# Patient Record
Sex: MALE | Race: WHITE | URBAN - NONMETROPOLITAN AREA
[De-identification: names, ages, dates, MRNs, and addresses within clinical notes are randomized per-mention and may not be internally consistent; named-entity substitution may affect disease eponyms.]

---

## 2020-12-28 LAB
ALBUMIN SERPL-MCNC: 0.4 G/DL
ALP BLD-CCNC: 41 U/L
ALT SERPL-CCNC: 28 U/L
ANION GAP SERPL CALCULATED.3IONS-SCNC: 16 MMOL/L
AST SERPL-CCNC: 19 U/L
BILIRUB SERPL-MCNC: 0.4 MG/DL (ref 0.1–1.4)
BUN BLDV-MCNC: 33 MG/DL
CALCIUM SERPL-MCNC: 9.9 MG/DL
CHLORIDE BLD-SCNC: 104 MMOL/L
CHOLESTEROL, TOTAL: 151 MG/DL
CHOLESTEROL/HDL RATIO: NORMAL
CO2: 29 MMOL/L
CREAT SERPL-MCNC: 1.64 MG/DL
CREATININE, URINE: 105
GFR CALCULATED: 41
GLUCOSE BLD-MCNC: 173 MG/DL
HDLC SERPL-MCNC: 40 MG/DL (ref 35–70)
LDL CHOLESTEROL CALCULATED: 81 MG/DL (ref 0–160)
MICROALBUMIN/CREAT 24H UR: 114.2 MG/G{CREAT}
MICROALBUMIN/CREAT UR-RTO: 108
NONHDLC SERPL-MCNC: NORMAL MG/DL
POTASSIUM SERPL-SCNC: 5.1 MMOL/L
SODIUM BLD-SCNC: 144 MMOL/L
TOTAL PROTEIN: 7.2
TRIGL SERPL-MCNC: 152 MG/DL
VLDLC SERPL CALC-MCNC: 30 MG/DL

## 2021-01-13 PROBLEM — Z79.4 TYPE 2 DIABETES MELLITUS WITH DIABETIC NEPHROPATHY, WITH LONG-TERM CURRENT USE OF INSULIN (HCC): Status: ACTIVE | Noted: 2019-02-20

## 2021-01-13 PROBLEM — E11.21 TYPE 2 DIABETES MELLITUS WITH DIABETIC NEPHROPATHY, WITH LONG-TERM CURRENT USE OF INSULIN (HCC): Status: ACTIVE | Noted: 2019-02-20

## 2021-01-13 PROBLEM — I10 ESSENTIAL HYPERTENSION, BENIGN: Status: ACTIVE | Noted: 2019-02-20

## 2021-01-13 PROBLEM — N40.0 BENIGN PROSTATIC HYPERPLASIA WITHOUT LOWER URINARY TRACT SYMPTOMS: Status: ACTIVE | Noted: 2019-02-20

## 2021-01-13 PROBLEM — E78.2 MIXED HYPERLIPIDEMIA: Status: ACTIVE | Noted: 2019-02-20

## 2021-01-19 ENCOUNTER — OFFICE VISIT (OUTPATIENT)
Dept: NEPHROLOGY | Age: 76
End: 2021-01-19
Payer: MEDICARE

## 2021-01-19 VITALS
BODY MASS INDEX: 22.89 KG/M2 | OXYGEN SATURATION: 97 % | WEIGHT: 155 LBS | DIASTOLIC BLOOD PRESSURE: 70 MMHG | SYSTOLIC BLOOD PRESSURE: 128 MMHG | HEART RATE: 71 BPM

## 2021-01-19 DIAGNOSIS — N20.0 NEPHROLITHIASIS: ICD-10-CM

## 2021-01-19 DIAGNOSIS — N18.32 STAGE 3B CHRONIC KIDNEY DISEASE (HCC): Primary | ICD-10-CM

## 2021-01-19 DIAGNOSIS — N40.1 BENIGN PROSTATIC HYPERPLASIA WITH LOWER URINARY TRACT SYMPTOMS, SYMPTOM DETAILS UNSPECIFIED: ICD-10-CM

## 2021-01-19 DIAGNOSIS — I10 HTN (HYPERTENSION), BENIGN: ICD-10-CM

## 2021-01-19 DIAGNOSIS — E11.21 DIABETIC NEPHROPATHY ASSOCIATED WITH TYPE 2 DIABETES MELLITUS (HCC): ICD-10-CM

## 2021-01-19 PROCEDURE — 4040F PNEUMOC VAC/ADMIN/RCVD: CPT | Performed by: INTERNAL MEDICINE

## 2021-01-19 PROCEDURE — G8420 CALC BMI NORM PARAMETERS: HCPCS | Performed by: INTERNAL MEDICINE

## 2021-01-19 PROCEDURE — G8427 DOCREV CUR MEDS BY ELIG CLIN: HCPCS | Performed by: INTERNAL MEDICINE

## 2021-01-19 PROCEDURE — 1036F TOBACCO NON-USER: CPT | Performed by: INTERNAL MEDICINE

## 2021-01-19 PROCEDURE — G8484 FLU IMMUNIZE NO ADMIN: HCPCS | Performed by: INTERNAL MEDICINE

## 2021-01-19 PROCEDURE — 1123F ACP DISCUSS/DSCN MKR DOCD: CPT | Performed by: INTERNAL MEDICINE

## 2021-01-19 PROCEDURE — 99205 OFFICE O/P NEW HI 60 MIN: CPT | Performed by: INTERNAL MEDICINE

## 2021-01-19 RX ORDER — NITROGLYCERIN 0.4 MG/1
0.4 TABLET SUBLINGUAL
COMMUNITY

## 2021-01-19 RX ORDER — EMPAGLIFLOZIN AND METFORMIN HYDROCHLORIDE 5; 1000 MG/1; MG/1
1 TABLET ORAL 2 TIMES DAILY
COMMUNITY
Start: 2020-09-02

## 2021-01-19 RX ORDER — INSULIN GLARGINE 300 U/ML
25 INJECTION, SOLUTION SUBCUTANEOUS EVERY MORNING
COMMUNITY
Start: 2020-09-02

## 2021-01-19 RX ORDER — HYDROCHLOROTHIAZIDE 25 MG/1
12.5 TABLET ORAL DAILY
COMMUNITY
Start: 2020-02-20 | End: 2021-02-26

## 2021-01-19 RX ORDER — DULAGLUTIDE 0.75 MG/.5ML
0.75 INJECTION, SOLUTION SUBCUTANEOUS WEEKLY
COMMUNITY
Start: 2020-11-23

## 2021-01-19 RX ORDER — CLOPIDOGREL BISULFATE 75 MG/1
75 TABLET ORAL DAILY
COMMUNITY

## 2021-01-19 ASSESSMENT — ENCOUNTER SYMPTOMS
BACK PAIN: 1
NAUSEA: 0
EYES NEGATIVE: 1
DIARRHEA: 0
COUGH: 0
ABDOMINAL PAIN: 0
SHORTNESS OF BREATH: 0
VOMITING: 0

## 2021-01-19 NOTE — PROGRESS NOTES
[facility]         Outpatient Initial consult note         1/19/2021 9:41 AM    [address] [address]  Dept: [phone]  Loc: [phone]      Patient Name:   Caesar Johnson  Birthdate:    [birth year]  Primary Care Physician:  Mingo Dinh DO     Chief Complaint : Evaluation of   worsening renal function     History of presenting illness :Caesar Johnson is a 67 y.o.   male with Past Medical History as stated below was sent / referred by Mingo Dinh DO forevaluation of the above problem. Patient has a history of hypertension for 35 years. Patient has history of diabetes mellitus, without retinopathy &nephropathy and for 35 years. The patient admits to history of renal stones , 40  years ago anddenies NSAID use. Patient has no history of proteinuria. Patient Never had a kidney biopsy done. Patient does not use Herbal remedies/vitamin supplements. Patient denies frequency, hematuria, hesitancy. Patient has no family history of kidney disease. Patient's chronic medical conditions of hypertension, diabetes and coronary artery disease are stable and well controlled with medications at this time    Patient recently had some lab work done by the PCP which showed that the patient's creatinine is worse and so nephrology has been consulted for further evaluation and management. Past History :     Past Medical History:   Diagnosis Date    Arthritis     CAD (coronary artery disease)     Chronic kidney disease     Diabetes mellitus ([address]     Hyperlipidemia     Hypertension      Past Surgical History:   Procedure Laterality Date    APPENDECTOMY      CHOLECYSTECTOMY      COLONOSCOPY      EYE SURGERY      HERNIA REPAIR      TONSILLECTOMY       Social History     Socioeconomic History    Marital status:       Spouse name: Not on file    Number of children: Not on file    Years of education: Not on file    Highest education level: Not on file   Occupational History    Not on file   Social Needs    Financial resource strain: Not on file    Food insecurity     Worry: Not on file     Inability: Not on file    Transportation needs     Medical: Not on file     Non-medical: Not on file   Tobacco Use    Smoking status: Former Smoker    Smokeless tobacco: Never Used   Substance and Sexual Activity    Alcohol use: No    Drug use: No    Sexual activity: Not Currently   Lifestyle    Physical activity     Days per week: Not on file     Minutes per session: Not on file    Stress: Not on file   Relationships    Social connections     Talks on phone: Not on file     Gets together: Not on file     Attends Religion service: Not on file     Active member of club or organization: Not on file     Attends meetings of clubs or organizations: Not on file     Relationship status: Not on file    Intimate partner violence     Fear of current or ex partner: Not on file     Emotionally abused: Not on file     Physically abused: Not on file     Forced sexual activity: Not on file   Other Topics Concern    Not on file   Social History Narrative    Not on file     Family History   Problem Relation Age of Onset    Cancer Mother      Medications & Allergies :      Prior to Admission medications    Medication Sig Start Date End Date Taking?  Authorizing Provider   clopidogrel (PLAVIX) 75 MG tablet Take 75 mg by mouth daily   Yes Historical Provider, MD   NONFORMULARY Take 1 capsule by mouth daily Florajejonas   Yes Historical Provider, MD   Glucos-MSM-C-Po-Vtkphd-Vjjkrc (GLUCOSAMINE MSM COMPLEX PO) Take by mouth daily   Yes Historical Provider, MD   hydroCHLOROthiazide (HYDRODIURIL) 25 MG tablet Take 12.5 mg by mouth daily  2/20/20  Yes Historical Provider, MD   Rosuvastatin Calcium 40 MG CPSP Take 40 mg by mouth every evening    Yes Historical Provider, MD Empagliflozin-metFORMIN HCl (SYNJARDY) 5-1000 MG TABS Take 1 tablet by mouth 2 times daily 9/2/20  Yes Historical Provider, MD   Insulin Glargine, 1 Unit Dial, (TOUJEO SOLOSTAR) 300 UNIT/ML SOPN Inject 25 Units into the skin every morning 9/2/20  Yes Historical Provider, MD   Dulaglutide (TRULICITY) 6.18 JS/6.2VJ SOPN Inject 0.75 mg into the skin once a week 11/23/20  Yes Historical Provider, MD   nitroGLYCERIN (NITROSTAT) 0.4 MG SL tablet Place 0.4 mg under the tongue   Yes Historical Provider, MD   vitamin D (CHOLECALCIFEROL) 125 MCG (5000 UT) CAPS capsule Take 5,000 Units by mouth 2 times daily   Yes Historical Provider, MD   aspirin 81 MG EC tablet Take 1 tablet by mouth daily. 4/22/15  Yes DARIO Joy CNP   lisinopril (PRINIVIL;ZESTRIL) 2.5 MG tablet Take 1 tablet by mouth daily. Patient taking differently: Take 20 mg by mouth daily  4/22/15  Yes DARIO Joy CNP   metoprolol (LOPRESSOR) 25 MG tablet Take 1 tablet by mouth 2 times daily. Patient taking differently: Take 50 mg by mouth 2 times daily  4/22/15  Yes DARIO Joy CNP   fenofibrate (TRICOR) 145 MG tablet Take 145 mg by mouth daily. Yes Historical Provider, MD   finasteride (PROSCAR) 5 MG tablet Take 5 mg by mouth daily. Yes Historical Provider, MD   Chromium Picolinate 1000 MCG TABS Take 1 tablet by mouth daily    Yes Historical Provider, MD   Cholecalciferol (VITAMIN D3) 5000 UNITS TABS Take 1 each by mouth daily. Yes Historical Provider, MD     Allergies: Latex    Review of Systems Physical Exam   Review of Systems   Constitutional: Negative for chills, fatigue and fever. HENT: Negative. Eyes: Negative. Respiratory: Negative for cough and shortness of breath. Cardiovascular: Negative for chest pain and leg swelling. Gastrointestinal: Negative for abdominal pain, diarrhea, nausea and vomiting. Genitourinary: Negative for dysuria, frequency and hematuria.    Musculoskeletal: Positive for back pain. Negative for neck pain. Skin: Negative for rash and wound. Neurological: Negative for dizziness, light-headedness and headaches. Psychiatric/Behavioral: Negative for agitation and confusion. Physical Exam  Vitals signs reviewed. Constitutional:       General: He is not in acute distress. Appearance: Normal appearance. He is not diaphoretic. HENT:      Head: Normocephalic and atraumatic. Right Ear: External ear normal.      Left Ear: External ear normal.      Nose: Nose normal.      Mouth/Throat:      Mouth: Mucous membranes are moist.      Pharynx: Oropharyngeal exudate present. Eyes:      General: No scleral icterus. Right eye: No discharge. Left eye: No discharge. Conjunctiva/sclera: Conjunctivae normal.   Neck:      Musculoskeletal: Normal range of motion and neck supple. Thyroid: No thyromegaly. Vascular: No JVD. Cardiovascular:      Rate and Rhythm: Normal rate and regular rhythm. Heart sounds: Normal heart sounds. No murmur. Pulmonary:      Effort: Pulmonary effort is normal. No respiratory distress. Breath sounds: Normal breath sounds. No stridor. Chest:      Chest wall: No tenderness. Abdominal:      General: Bowel sounds are normal. There is distension. Palpations: Abdomen is soft. Tenderness: There is no abdominal tenderness. Musculoskeletal:         General: No tenderness. Skin:     General: Skin is warm and dry. Findings: No erythema or rash. Neurological:      General: No focal deficit present. Mental Status: He is alert and oriented to person, place, and time.    Psychiatric:         Mood and Affect: Mood normal.         Behavior: Behavior normal.          Vitals   Vitals:    01/19/21 0850   BP: 128/70   Site: Left Upper Arm   Position: Sitting   Cuff Size: Medium Adult   Pulse: 71   SpO2: 97%   Weight: 155 lb (70.3 kg)        Labs, Radiology and Tests :    Labs -    21/20 Potassium 5.1           BUN 33           Creatinine 1.64           eGFR 41                                   UMCR 108                         Renal Ultrasound Scan --will order       Other : old  lab data have been reviewed and noted that patient's creatinine normally runs around 1.3 however in February 2020 it was 1.53      Assessment    1. Renal -patient definitely appears to be having chronic kidney disease stage III, this is most likely secondary to senile nephrosclerosis longstanding hypertension and diabetes  -However patient has a history of renal stones and severely enlarged prostate based on the CT scan in 2007. Cannot rule out a component of mild obstruction at this time  -I will obtain urinalysis urine protein creatinine ratio and a repeat BMP in a month. Patient is on hydrochlorothiazide though and he drinks a lot of liquids. Discontinue hydrochlorothiazide for now  2. Electrolytes appear to be within normal limits  3. Essential hypertension running well  4. History of nephrolithiasis his CT scan from 2007 shows renal stones we will reevaluate with a renal ultrasound scan  5. Hx of prostatomegaly based on the CT from 2007 not symptomatic however there was a concern for malignancy we will obtain a PSA level as well  6. Diabetes mellitus with diabetic nephropathy. 7. Patient also takes vitamin D 5000 units daily we will check a vitamin D level as well in the next visit  8. Medications reviewed discussed with patient and wife in detail  5. Follow-up in 4 weeks in [address] [place] [address] This Encounter   Procedures    US RENAL LIMITED    Comprehensive Metabolic Panel    Microalbumin / Creatinine Urine Ratio    Urinalysis with Microscopic    Protein / creatinine ratio, urine    PSA Prostatic Specific Antigen       Vick Schafer M.D  [facility].

## 2021-02-17 LAB
ALBUMIN SERPL-MCNC: 4.3 G/DL
ALP BLD-CCNC: 42 U/L
ALT SERPL-CCNC: 23 U/L
ANION GAP SERPL CALCULATED.3IONS-SCNC: 1.7 MMOL/L
AST SERPL-CCNC: 31 U/L
BILIRUB SERPL-MCNC: 0.5 MG/DL (ref 0.1–1.4)
BILIRUBIN, URINE: NEGATIVE
BLOOD, URINE: NEGATIVE
BUN BLDV-MCNC: 22 MG/DL
CALCIUM SERPL-MCNC: 9.7 MG/DL
CHLORIDE BLD-SCNC: 105 MMOL/L
CLARITY: CLEAR
CO2: 28 MMOL/L
COLOR: YELLOW
CREAT SERPL-MCNC: 1.2 MG/DL
CREATININE URINE: 65 MG/DL
GFR CALCULATED: 59
GLUCOSE BLD-MCNC: 228 MG/DL
GLUCOSE URINE: ABNORMAL
KETONES, URINE: NEGATIVE
LEUKOCYTE ESTERASE, URINE: NEGATIVE
MICROALBUMIN/CREAT 24H UR: 263.5 MG/G{CREAT}
NITRITE, URINE: NEGATIVE
PH UA: 5.5 (ref 4.5–8)
POTASSIUM SERPL-SCNC: 4.5 MMOL/L
PROTEIN UA: POSITIVE
SODIUM BLD-SCNC: 141 MMOL/L
SPECIFIC GRAVITY, URINE: 1.02
TOTAL PROTEIN: 6.8
UROBILINOGEN, URINE: NORMAL

## 2021-02-26 ENCOUNTER — OFFICE VISIT (OUTPATIENT)
Dept: NEPHROLOGY | Age: 76
End: 2021-02-26
Payer: MEDICARE

## 2021-02-26 VITALS
BODY MASS INDEX: 23.48 KG/M2 | HEART RATE: 71 BPM | DIASTOLIC BLOOD PRESSURE: 85 MMHG | WEIGHT: 159 LBS | OXYGEN SATURATION: 96 % | SYSTOLIC BLOOD PRESSURE: 143 MMHG

## 2021-02-26 DIAGNOSIS — I10 HTN (HYPERTENSION), BENIGN: ICD-10-CM

## 2021-02-26 DIAGNOSIS — N18.32 STAGE 3B CHRONIC KIDNEY DISEASE (HCC): ICD-10-CM

## 2021-02-26 DIAGNOSIS — N20.0 NEPHROLITHIASIS: Primary | ICD-10-CM

## 2021-02-26 DIAGNOSIS — E11.21 DIABETIC NEPHROPATHY ASSOCIATED WITH TYPE 2 DIABETES MELLITUS (HCC): ICD-10-CM

## 2021-02-26 PROCEDURE — G8428 CUR MEDS NOT DOCUMENT: HCPCS | Performed by: INTERNAL MEDICINE

## 2021-02-26 PROCEDURE — G8420 CALC BMI NORM PARAMETERS: HCPCS | Performed by: INTERNAL MEDICINE

## 2021-02-26 PROCEDURE — 1123F ACP DISCUSS/DSCN MKR DOCD: CPT | Performed by: INTERNAL MEDICINE

## 2021-02-26 PROCEDURE — 99213 OFFICE O/P EST LOW 20 MIN: CPT | Performed by: INTERNAL MEDICINE

## 2021-02-26 PROCEDURE — 4040F PNEUMOC VAC/ADMIN/RCVD: CPT | Performed by: INTERNAL MEDICINE

## 2021-02-26 PROCEDURE — 1036F TOBACCO NON-USER: CPT | Performed by: INTERNAL MEDICINE

## 2021-02-26 PROCEDURE — G8484 FLU IMMUNIZE NO ADMIN: HCPCS | Performed by: INTERNAL MEDICINE

## 2021-02-26 RX ORDER — LISINOPRIL 30 MG/1
30 TABLET ORAL DAILY
Qty: 90 TABLET | Refills: 1 | Status: SHIPPED | OUTPATIENT
Start: 2021-02-26 | End: 2021-08-27

## 2021-02-26 NOTE — PROGRESS NOTES
[facility]        Outpatient Follow-Up note         2/26/2021 11:26 AM    Patient Name:   Skye Paez  Birthdate:    [birth year]  Primary Care Physician:  Izaiah Pedraza DO   [facility] [address] [id number]  Dept: [phone]  Loc: [phone]     Chief Complaint / Reason for follow-up : Follow Up of RANDALL/CKD     Interval History :  Patient seen and examined by me.    Feels well denies any complaints no chest pain no shortness of breath no leg swelling     Past History :  Past Medical History:   Diagnosis Date    Arthritis     CAD (coronary artery disease)     Chronic kidney disease     Diabetes mellitus ([facility]     Hyperlipidemia     Hypertension      Past Surgical History:   Procedure Laterality Date    APPENDECTOMY      CHOLECYSTECTOMY      COLONOSCOPY      EYE SURGERY      HERNIA REPAIR      TONSILLECTOMY          Medications :     Outpatient Medications Marked as Taking for the 2/26/21 encounter (Office Visit) with Renetta Harding MD   Medication Sig Dispense Refill    clopidogrel (PLAVIX) 75 MG tablet Take 75 mg by mouth daily      NONFORMULARY Take 1 capsule by mouth daily Florajen      Glucos-MSM-C-Id-Bldhag-Zoqwyc (GLUCOSAMINE MSM COMPLEX PO) Take by mouth daily      hydroCHLOROthiazide (HYDRODIURIL) 25 MG tablet Take 12.5 mg by mouth daily       Rosuvastatin Calcium 40 MG CPSP Take 40 mg by mouth every evening       Empagliflozin-metFORMIN HCl (SYNJARDY) 5-1000 MG TABS Take 1 tablet by mouth 2 times daily      Insulin Glargine, 1 Unit Dial, (TOUJEO SOLOSTAR) 300 UNIT/ML SOPN Inject 25 Units into the skin every morning      Dulaglutide (TRULICITY) 2.68 WG/4.0AD SOPN Inject 0.75 mg into the skin once a week      nitroGLYCERIN (NITROSTAT) 0.4 MG SL tablet Place 0.4 mg under the tongue      vitamin D (CHOLECALCIFEROL) 125 MCG (5000 UT) CAPS capsule Take 5,000 Units by mouth 2 times daily      aspirin 81 MG EC tablet Take 1 tablet by mouth daily. 30 tablet 3    lisinopril (PRINIVIL;ZESTRIL) 2.5 MG tablet Take 1 tablet by mouth daily. (Patient taking differently: Take 20 mg by mouth daily ) 30 tablet 0    metoprolol (LOPRESSOR) 25 MG tablet Take 1 tablet by mouth 2 times daily. (Patient taking differently: Take 50 mg by mouth 2 times daily ) 60 tablet 0    fenofibrate (TRICOR) 145 MG tablet Take 145 mg by mouth daily.  finasteride (PROSCAR) 5 MG tablet Take 5 mg by mouth daily.  Chromium Picolinate 1000 MCG TABS Take 1 tablet by mouth daily       Cholecalciferol (VITAMIN D3) 5000 UNITS TABS Take 1 each by mouth daily. Vitals     BP (!) 143/85 (Site: Right Upper Arm, Position: Sitting, Cuff Size: Medium Adult)   Pulse 71   Wt 159 lb (72.1 kg)   SpO2 96%   BMI 23.48 kg/m²  Wt Readings from Last 3 Encounters:   02/26/21 159 lb (72.1 kg)   01/19/21 155 lb (70.3 kg)        Physical Exam     General -- no distress  Lungs -- clear  Heart -- S1, S2 heard, JVD - no  Abdomen - soft, non-tender  Extremities -- no edema  CNS - awake and alert    Labs, Radiology and Tests    Labs -     12/20 2/21                  Potassium 5.1  4.5                 BUN 33  22                 Creatinine 1.64  1.20                 eGFR 41  59                                            800                 UMCR 108  406                                          Renal Ultrasound Scan -- 2/21  Right kidney 9.1 cm left kidney 10.4 cm  No other acute abnormality      Other : old  lab data have been reviewed and noted that patient's creatinine normally runs around 1.3 however in February 2020 it was 1.53        Assessment    1.  Renal -patient definitely appears to be having chronic kidney disease stage III, this is most likely secondary to senile nephrosclerosis longstanding hypertension and diabetes  -However patient has a history of renal stones and severely enlarged prostate based on the CT scan in 2007. Renal ultrasound scan is negative for any pathology  -Creatinine improved after discontinuation of hydrochlorothiazide. Back to his baseline  -Does have some proteinuria mostly due to diabetic nephropathy. 2. Electrolytes appear to be within normal limits  3. Essential hypertension running slightly high increase lisinopril to 30 mg  4. History of nephrolithiasis his CT scan from 2007 shows renal stones we will reevaluate with a renal ultrasound scan  5. Hx of prostatomegaly based on the CT from 2007 not symptomatic however there was a concern for malignancy. PSA level is 4.4. Sees Dr. Jerman Meléndez in [place]  6. Diabetes mellitus with diabetic nephropathy. Increase lisinopril to 30 mg  7. Medications reviewed discussed with patient and wife in detail  8. Follow-up in 6 months    Tests and orders placed this Encounter     Orders Placed This Encounter   Procedures    Basic Metabolic Panel    Protein, urine, random    MICROALBUMIN, RANDOM URINE (W/O CREATININE)    Creatinine, Random Urine    Urinalysis with Microscopic       Kendra De M.D  [facility].

## 2021-03-15 ENCOUNTER — TELEPHONE (OUTPATIENT)
Dept: NEPHROLOGY | Age: 76
End: 2021-03-15

## 2021-03-15 DIAGNOSIS — N20.0 NEPHROLITHIASIS: ICD-10-CM

## 2021-03-15 DIAGNOSIS — N18.32 STAGE 3B CHRONIC KIDNEY DISEASE (HCC): ICD-10-CM

## 2021-03-15 DIAGNOSIS — N40.1 BENIGN PROSTATIC HYPERPLASIA WITH LOWER URINARY TRACT SYMPTOMS, SYMPTOM DETAILS UNSPECIFIED: ICD-10-CM

## 2021-08-04 LAB
BILIRUBIN, URINE: NEGATIVE
BLOOD, URINE: NEGATIVE
BUN BLDV-MCNC: 25 MG/DL
CALCIUM SERPL-MCNC: 10.3 MG/DL
CHLORIDE BLD-SCNC: 105 MMOL/L
CLARITY: CLEAR
CO2: 27 MMOL/L
COLOR: YELLOW
CREAT SERPL-MCNC: 1.2 MG/DL
CREATININE, URINE: 61
GFR CALCULATED: 59
GLUCOSE BLD-MCNC: 145 MG/DL
GLUCOSE URINE: ABNORMAL
KETONES, URINE: NEGATIVE
LEUKOCYTE ESTERASE, URINE: NEGATIVE
MICROALBUMIN/CREAT 24H UR: 268.2 MG/G{CREAT}
MICROALBUMIN/CREAT UR-RTO: 441
NITRITE, URINE: NEGATIVE
PH UA: 5.5 (ref 4.5–8)
POTASSIUM SERPL-SCNC: 5.1 MMOL/L
PROTEIN UA: ABNORMAL
PROTEIN, URINE, RANDOM: 50
SODIUM BLD-SCNC: 141 MMOL/L
SPECIFIC GRAVITY, URINE: 1.02
UROBILINOGEN, URINE: NORMAL

## 2021-08-27 ENCOUNTER — OFFICE VISIT (OUTPATIENT)
Dept: NEPHROLOGY | Age: 76
End: 2021-08-27
Payer: MEDICARE

## 2021-08-27 VITALS
HEART RATE: 72 BPM | TEMPERATURE: 97.5 F | BODY MASS INDEX: 22.77 KG/M2 | DIASTOLIC BLOOD PRESSURE: 75 MMHG | OXYGEN SATURATION: 96 % | WEIGHT: 154.2 LBS | SYSTOLIC BLOOD PRESSURE: 160 MMHG

## 2021-08-27 DIAGNOSIS — N18.32 STAGE 3B CHRONIC KIDNEY DISEASE (HCC): Primary | ICD-10-CM

## 2021-08-27 DIAGNOSIS — E11.21 DIABETIC NEPHROPATHY ASSOCIATED WITH TYPE 2 DIABETES MELLITUS (HCC): ICD-10-CM

## 2021-08-27 DIAGNOSIS — N20.0 NEPHROLITHIASIS: ICD-10-CM

## 2021-08-27 DIAGNOSIS — N40.1 BENIGN PROSTATIC HYPERPLASIA WITH LOWER URINARY TRACT SYMPTOMS, SYMPTOM DETAILS UNSPECIFIED: ICD-10-CM

## 2021-08-27 DIAGNOSIS — I10 HTN (HYPERTENSION), BENIGN: ICD-10-CM

## 2021-08-27 PROCEDURE — 1123F ACP DISCUSS/DSCN MKR DOCD: CPT | Performed by: INTERNAL MEDICINE

## 2021-08-27 PROCEDURE — G8427 DOCREV CUR MEDS BY ELIG CLIN: HCPCS | Performed by: INTERNAL MEDICINE

## 2021-08-27 PROCEDURE — G8420 CALC BMI NORM PARAMETERS: HCPCS | Performed by: INTERNAL MEDICINE

## 2021-08-27 PROCEDURE — 99213 OFFICE O/P EST LOW 20 MIN: CPT | Performed by: INTERNAL MEDICINE

## 2021-08-27 PROCEDURE — 1036F TOBACCO NON-USER: CPT | Performed by: INTERNAL MEDICINE

## 2021-08-27 PROCEDURE — 4040F PNEUMOC VAC/ADMIN/RCVD: CPT | Performed by: INTERNAL MEDICINE

## 2021-08-27 RX ORDER — LISINOPRIL 40 MG/1
40 TABLET ORAL DAILY
Qty: 90 TABLET | Refills: 2 | Status: SHIPPED | OUTPATIENT
Start: 2021-08-27

## 2021-08-27 NOTE — PROGRESS NOTES
[facility]        Outpatient Follow-Up note         8/27/2021 9:17 AM    Patient Name:   Vivek Ambriz  Birthdate:    [birth year]  Primary Care Physician:  Penelope Loera DO   [facility] [address] [address]  Dept: [phone]  Loc: [phone]     Chief Complaint / Reason for follow-up : Follow Up of RANDALL/CKD     Interval History :  Patient seen and examined by me.    Feels well denies any complaints no chest pain no shortness of breath no leg swelling     Past History :  Past Medical History:   Diagnosis Date    Arthritis     CAD (coronary artery disease)     Chronic kidney disease     Diabetes mellitus ([address]     Hyperlipidemia     Hypertension      Past Surgical History:   Procedure Laterality Date    APPENDECTOMY      CHOLECYSTECTOMY      COLONOSCOPY      EYE SURGERY      HERNIA REPAIR      TONSILLECTOMY          Medications :     Outpatient Medications Marked as Taking for the 8/27/21 encounter (Office Visit) with Timur Carney MD   Medication Sig Dispense Refill    lisinopril (PRINIVIL;ZESTRIL) 30 MG tablet Take 1 tablet by mouth daily 90 tablet 1    clopidogrel (PLAVIX) 75 MG tablet Take 75 mg by mouth daily      NONFORMULARY Take 1 capsule by mouth daily Florajen      Glucos-MSM-C-Bp-Ytdjnk-Yxdgeo (GLUCOSAMINE MSM COMPLEX PO) Take by mouth daily      Rosuvastatin Calcium 40 MG CPSP Take 40 mg by mouth every evening       Empagliflozin-metFORMIN HCl (SYNJARDY) 5-1000 MG TABS Take 1 tablet by mouth 2 times daily      Insulin Glargine, 1 Unit Dial, (TOUJEO SOLOSTAR) 300 UNIT/ML SOPN Inject 25 Units into the skin every morning      Dulaglutide (TRULICITY) 5.92 QX/8.3CU SOPN Inject 0.75 mg into the skin once a week      nitroGLYCERIN (NITROSTAT) 0.4 MG SL tablet Place 0.4 mg under the tongue      vitamin D (CHOLECALCIFEROL) 125 MCG (5000 UT) CAPS capsule Take 5,000 Units by mouth 2 times daily      aspirin 81 MG EC tablet Take 1 tablet by mouth daily. 30 tablet 3    metoprolol (LOPRESSOR) 25 MG tablet Take 1 tablet by mouth 2 times daily. (Patient taking differently: Take 50 mg by mouth 2 times daily ) 60 tablet 0    fenofibrate (TRICOR) 145 MG tablet Take 145 mg by mouth daily.  finasteride (PROSCAR) 5 MG tablet Take 5 mg by mouth daily.  Chromium Picolinate 1000 MCG TABS Take 1 tablet by mouth daily       Cholecalciferol (VITAMIN D3) 5000 UNITS TABS Take 1 each by mouth daily. Vitals     BP (!) 160/75 (Site: Right Upper Arm, Position: Sitting, Cuff Size: Medium Adult)   Pulse 72   Temp 97.5 °F (36.4 °C)   Wt 154 lb 3.2 oz (69.9 kg)   SpO2 96%   BMI 22.77 kg/m²  Wt Readings from Last 3 Encounters:   08/27/21 154 lb 3.2 oz (69.9 kg)   02/26/21 159 lb (72.1 kg)   01/19/21 155 lb (70.3 kg)        Physical Exam     General -- no distress  Lungs -- clear  Heart -- S1, S2 heard, JVD - no  Abdomen - soft, non-tender  Extremities -- no edema  CNS - awake and alert    Labs, Radiology and Tests    Labs -     12/20 2/21 8/21                Potassium 5.1  4.5  5.1               BUN 33  22  25               Creatinine 1.64  1.20  1.20               eGFR 41  59  59                                          800 900                UMCR 108  406  441                                        Renal Ultrasound Scan -- 2/21  Right kidney 9.1 cm left kidney 10.4 cm  No other acute abnormality      Other : old  lab data have been reviewed and noted that patient's creatinine normally runs around 1.3 however in February 2020 it was 1.53        Assessment    1. Renal -patient definitely appears to be having chronic kidney disease stage III, this is most likely secondary to senile nephrosclerosis longstanding hypertension and diabetes  -However patient has a history of renal stones and severely enlarged prostate based on the CT scan in 2007.     Renal ultrasound scan is negative for any pathology  -Creatinine currently maintaining stable at baseline.  -Does have some proteinuria mostly due to diabetic nephropathy. 2. Electrolytes appear to be within normal limits K slightly high at 5.1 increased intake of tomatoes lately and is also on lisinopril advised slightly low potassium diet  3. Essential hypertension running slightly, due to presence of proteinuria will increase the lisinopril to 40 mg p.o. daily  4. History of nephrolithiasis his CT scan from 2007 shows renal stones renal ultrasound scan is negative  5. Hx of prostatomegaly based on the CT from 2007 not symptomatic however there was a concern for malignancy. PSA level is 4.4. Sees Dr. Pippa Khalil in [facility]  6. Diabetes mellitus with diabetic nephropathy. Increase lisinopril to 40 mg  7. Medications reviewed discussed with patient and wife in detail  8. Follow-up in 6 months    Tests and orders placed this Encounter     Orders Placed This Encounter   Procedures    Basic Metabolic Panel    Urinalysis with Microscopic    Creatinine, Random Urine    MICROALBUMIN, RANDOM URINE (W/O CREATININE)    Protein, urine, random       Cristian Hoff M.D  [facility].